# Patient Record
Sex: FEMALE | Race: WHITE | NOT HISPANIC OR LATINO | Employment: FULL TIME | URBAN - METROPOLITAN AREA
[De-identification: names, ages, dates, MRNs, and addresses within clinical notes are randomized per-mention and may not be internally consistent; named-entity substitution may affect disease eponyms.]

---

## 2022-04-07 ENCOUNTER — HISTORICAL (OUTPATIENT)
Dept: ADMINISTRATIVE | Facility: HOSPITAL | Age: 47
End: 2022-04-07

## 2022-04-23 VITALS
DIASTOLIC BLOOD PRESSURE: 83 MMHG | WEIGHT: 130.06 LBS | BODY MASS INDEX: 22.2 KG/M2 | SYSTOLIC BLOOD PRESSURE: 124 MMHG | OXYGEN SATURATION: 95 % | HEIGHT: 64 IN

## 2023-12-10 ENCOUNTER — OFFICE VISIT (OUTPATIENT)
Dept: URGENT CARE | Facility: CLINIC | Age: 48
End: 2023-12-10
Payer: MEDICAID

## 2023-12-10 VITALS
OXYGEN SATURATION: 99 % | SYSTOLIC BLOOD PRESSURE: 116 MMHG | RESPIRATION RATE: 18 BRPM | DIASTOLIC BLOOD PRESSURE: 76 MMHG | HEART RATE: 66 BPM | BODY MASS INDEX: 21.34 KG/M2 | WEIGHT: 125 LBS | HEIGHT: 64 IN | TEMPERATURE: 98 F

## 2023-12-10 DIAGNOSIS — J02.9 PHARYNGITIS, UNSPECIFIED ETIOLOGY: Primary | ICD-10-CM

## 2023-12-10 LAB
CTP QC/QA: YES
MOLECULAR STREP A: NEGATIVE

## 2023-12-10 PROCEDURE — 99213 PR OFFICE/OUTPT VISIT, EST, LEVL III, 20-29 MIN: ICD-10-PCS | Mod: ,,,

## 2023-12-10 PROCEDURE — 87651 STREP A DNA AMP PROBE: CPT | Mod: QW,,,

## 2023-12-10 PROCEDURE — 99213 OFFICE O/P EST LOW 20 MIN: CPT | Mod: ,,,

## 2023-12-10 PROCEDURE — 87651 POCT STREP A MOLECULAR: ICD-10-PCS | Mod: QW,,,

## 2023-12-10 RX ORDER — PREDNISONE 20 MG/1
TABLET ORAL
Qty: 8 TABLET | Refills: 0 | Status: SHIPPED | OUTPATIENT
Start: 2023-12-10

## 2023-12-10 NOTE — PATIENT INSTRUCTIONS
Strep swab negative  Condition and course discussed.   Steroids may assist with sore throat.  Drink plenty of fluids and get plenty of rest.    Claritin, Zyrtec or other OTC antihistamine for nasal congestion.   Warm saltwater gargles for sore throat.  Warm water with honey to help coat the throat.  Throat lozenges.  Chloraseptic Spray for worsening sore throat  Tylenol and ibuprofen as needed for sore throat and fever.       Call or return to clinic as needed.  Go to the ER with any significant change or worsening of symptoms.   Follow up with your primary care doctor.  If you do not have a primary care provider and need a referral, one may be placed for you by the clinic.     Smoking cessation strongly encouraged.  Assistance offered, information will be provided.  If not ready to quit now, patient will contact this clinic in the future if I can be of any specific health related to the discontinuation of smoking/tobacco use.

## 2023-12-10 NOTE — PROGRESS NOTES
"Subjective:      Patient ID: Tonya Mariano is a 48 y.o. female.    Vitals:  height is 5' 4" (1.626 m) and weight is 56.7 kg (125 lb). Her oral temperature is 98.1 °F (36.7 °C). Her blood pressure is 116/76 and her pulse is 66. Her respiration is 18 and oxygen saturation is 99%.     Chief Complaint: Sore Throat     Patient is a 48 y.o. female who presents to urgent care with complaints of right sided sore throat that began 3 days ago.  Reports sore throat is unilateral.  Pain with swallowing.  Alleviating factors include warm liquids with mild amount of relief. Patient denies fever, cough, congestion, body aches, neck stiffness, rash, GI symptoms, shortness breath or difficulty swallowing.      ROS   Objective:     Physical Exam   Constitutional: She is oriented to person, place, and time. She appears well-developed. She is cooperative.  Non-toxic appearance. She does not appear ill. No distress.   HENT:   Head: Normocephalic and atraumatic.   Ears:   Right Ear: Hearing, tympanic membrane, external ear and ear canal normal.   Left Ear: Hearing, tympanic membrane, external ear and ear canal normal.   Nose: Nose normal. No mucosal edema, rhinorrhea or nasal deformity. No epistaxis. Right sinus exhibits no maxillary sinus tenderness and no frontal sinus tenderness. Left sinus exhibits no maxillary sinus tenderness and no frontal sinus tenderness.   Mouth/Throat: Uvula is midline, oropharynx is clear and moist and mucous membranes are normal. Mucous membranes are moist. No trismus in the jaw. Normal dentition. No uvula swelling. No oropharyngeal exudate, posterior oropharyngeal edema or posterior oropharyngeal erythema.   Eyes: Conjunctivae and lids are normal. No scleral icterus.   Neck: Trachea normal and phonation normal. Neck supple. No edema present. No erythema present. No neck rigidity present.   Cardiovascular: Normal rate, regular rhythm, normal heart sounds and normal pulses.   Pulmonary/Chest: Effort normal " and breath sounds normal. No respiratory distress. She has no decreased breath sounds. She has no wheezes. She has no rhonchi. She has no rales.   Abdominal: Normal appearance.   Musculoskeletal: Normal range of motion.         General: No deformity. Normal range of motion.   Lymphadenopathy:     She has no cervical adenopathy.   Neurological: She is alert and oriented to person, place, and time. She exhibits normal muscle tone. Coordination normal.   Skin: Skin is warm, dry, intact, not diaphoretic and not pale.   Psychiatric: Her speech is normal and behavior is normal. Judgment and thought content normal.   Nursing note and vitals reviewed.      Assessment:     1. Pharyngitis, unspecified etiology        Plan:       Pharyngitis, unspecified etiology  -     POCT Strep A, Molecular    Other orders  -     predniSONE (DELTASONE) 20 MG tablet; One tablet orally twice daily for 3 days and then once daily for 2 days  Dispense: 8 tablet; Refill: 0    Due to absence of other symptoms and unilateral nature of sore throat, discussed calling back into clinic for possible ENT referral or follow up with primary care provider if symptoms fail to improve despite over-the-counter and steroid treatment.    Patient declines testing in clinic.    Strep swab negative  Condition and course discussed.   Steroids may assist with sore throat.  Drink plenty of fluids and get plenty of rest.    Claritin, Zyrtec or other OTC antihistamine for nasal congestion.   Warm saltwater gargles for sore throat.  Warm water with honey to help coat the throat.  Throat lozenges.  Chloraseptic Spray for worsening sore throat  Tylenol and ibuprofen as needed for sore throat and fever.       Call or return to clinic as needed.  Go to the ER with any significant change or worsening of symptoms.   Follow up with your primary care doctor.  If you do not have a primary care provider and need a referral, one may be placed for you by the clinic.     Smoking  cessation strongly encouraged.  Assistance offered, information will be provided.  If not ready to quit now, patient will contact this clinic in the future if I can be of any specific health related to the discontinuation of smoking/tobacco use.

## 2025-06-19 ENCOUNTER — TELEPHONE (OUTPATIENT)
Dept: PRIMARY CARE CLINIC | Facility: CLINIC | Age: 50
End: 2025-06-19
Payer: COMMERCIAL

## 2025-06-26 ENCOUNTER — OFFICE VISIT (OUTPATIENT)
Dept: PRIMARY CARE CLINIC | Facility: CLINIC | Age: 50
End: 2025-06-26
Payer: COMMERCIAL

## 2025-06-26 VITALS
WEIGHT: 143 LBS | DIASTOLIC BLOOD PRESSURE: 84 MMHG | BODY MASS INDEX: 24.41 KG/M2 | SYSTOLIC BLOOD PRESSURE: 134 MMHG | TEMPERATURE: 98 F | RESPIRATION RATE: 18 BRPM | HEART RATE: 66 BPM | HEIGHT: 64 IN | OXYGEN SATURATION: 98 %

## 2025-06-26 DIAGNOSIS — E86.0 DEHYDRATION: ICD-10-CM

## 2025-06-26 DIAGNOSIS — Z76.89 ENCOUNTER TO ESTABLISH CARE WITH NEW DOCTOR: Primary | ICD-10-CM

## 2025-06-26 DIAGNOSIS — F17.210 CIGARETTE NICOTINE DEPENDENCE WITHOUT COMPLICATION: Chronic | ICD-10-CM

## 2025-06-26 DIAGNOSIS — M54.50 CHRONIC BILATERAL LOW BACK PAIN WITHOUT SCIATICA: Chronic | ICD-10-CM

## 2025-06-26 DIAGNOSIS — E83.39 HYPERPHOSPHATEMIA: ICD-10-CM

## 2025-06-26 DIAGNOSIS — E87.5 HYPERKALEMIA: ICD-10-CM

## 2025-06-26 DIAGNOSIS — G89.29 CHRONIC BILATERAL LOW BACK PAIN WITHOUT SCIATICA: Chronic | ICD-10-CM

## 2025-06-26 DIAGNOSIS — H61.23 EXCESSIVE CERUMEN IN EAR CANAL, BILATERAL: ICD-10-CM

## 2025-06-26 DIAGNOSIS — E83.52 HYPERCALCEMIA: ICD-10-CM

## 2025-06-26 DIAGNOSIS — Z00.00 WELLNESS EXAMINATION: ICD-10-CM

## 2025-06-26 DIAGNOSIS — F41.1 GAD (GENERALIZED ANXIETY DISORDER): ICD-10-CM

## 2025-06-26 DIAGNOSIS — E78.2 MIXED HYPERLIPIDEMIA: Chronic | ICD-10-CM

## 2025-06-26 PROCEDURE — 99204 OFFICE O/P NEW MOD 45 MIN: CPT | Mod: 25,,, | Performed by: STUDENT IN AN ORGANIZED HEALTH CARE EDUCATION/TRAINING PROGRAM

## 2025-06-26 PROCEDURE — 69210 REMOVE IMPACTED EAR WAX UNI: CPT | Mod: ,,, | Performed by: STUDENT IN AN ORGANIZED HEALTH CARE EDUCATION/TRAINING PROGRAM

## 2025-06-26 PROCEDURE — 3075F SYST BP GE 130 - 139MM HG: CPT | Mod: CPTII,,, | Performed by: STUDENT IN AN ORGANIZED HEALTH CARE EDUCATION/TRAINING PROGRAM

## 2025-06-26 PROCEDURE — 1160F RVW MEDS BY RX/DR IN RCRD: CPT | Mod: CPTII,,, | Performed by: STUDENT IN AN ORGANIZED HEALTH CARE EDUCATION/TRAINING PROGRAM

## 2025-06-26 PROCEDURE — 3079F DIAST BP 80-89 MM HG: CPT | Mod: CPTII,,, | Performed by: STUDENT IN AN ORGANIZED HEALTH CARE EDUCATION/TRAINING PROGRAM

## 2025-06-26 PROCEDURE — 1159F MED LIST DOCD IN RCRD: CPT | Mod: CPTII,,, | Performed by: STUDENT IN AN ORGANIZED HEALTH CARE EDUCATION/TRAINING PROGRAM

## 2025-06-26 PROCEDURE — 3008F BODY MASS INDEX DOCD: CPT | Mod: CPTII,,, | Performed by: STUDENT IN AN ORGANIZED HEALTH CARE EDUCATION/TRAINING PROGRAM

## 2025-06-26 RX ORDER — FLUTICASONE PROPIONATE 50 MCG
1 SPRAY, SUSPENSION (ML) NASAL DAILY PRN
COMMUNITY

## 2025-06-26 RX ORDER — IBUPROFEN 800 MG/1
800 TABLET, FILM COATED ORAL EVERY 6 HOURS PRN
COMMUNITY

## 2025-06-26 RX ORDER — DIAZEPAM 5 MG/1
5 TABLET ORAL EVERY 12 HOURS PRN
Qty: 30 TABLET | Refills: 3 | Status: SHIPPED | OUTPATIENT
Start: 2025-06-26

## 2025-06-27 ENCOUNTER — TELEPHONE (OUTPATIENT)
Dept: PRIMARY CARE CLINIC | Facility: CLINIC | Age: 50
End: 2025-06-27
Payer: COMMERCIAL

## 2025-06-27 DIAGNOSIS — Z72.0 TOBACCO USE: Primary | ICD-10-CM

## 2025-06-27 DIAGNOSIS — Z72.0 TOBACCO USE: ICD-10-CM

## 2025-06-27 RX ORDER — NICOTINE 7MG/24HR
1 PATCH, TRANSDERMAL 24 HOURS TRANSDERMAL DAILY
Qty: 30 PATCH | Refills: 0 | Status: SHIPPED | OUTPATIENT
Start: 2025-06-27

## 2025-06-27 RX ORDER — NICOTINE 7MG/24HR
1 PATCH, TRANSDERMAL 24 HOURS TRANSDERMAL DAILY
Qty: 30 PATCH | Refills: 0 | Status: SHIPPED | OUTPATIENT
Start: 2025-06-27 | End: 2025-06-27 | Stop reason: SDUPTHER

## 2025-06-27 NOTE — TELEPHONE ENCOUNTER
Spoke with patient and she stated she qualified for the smoking cessation program and possibly get the nicotine patches for free. Patient stated she will reach out to the program and If she can't get the patches for free, she will reach out to us to fill the nicotine patches.

## 2025-06-27 NOTE — TELEPHONE ENCOUNTER
Copied from CRM #[id number]. Topic: General Inquiry - Return Call  >> Jun 27, 2025  8:29 AM Loren wrote:  .Type:  Patient Returning Call    Who Called:pt  Who Left Message for Patient:pt  Does the patient know what this is regarding?: nicotine patches    Would the patient rather a call back or a response via [employer]? ADRIANA  Best Call Back Number:[phone]  Additional Information: Please call back patient did reach out and was told to contact her Dr for the patches

## 2025-06-27 NOTE — TELEPHONE ENCOUNTER
No answer, left voicemail  Patient was advised her nicotine patches will be sent to the pharmacy.

## 2025-06-27 NOTE — TELEPHONE ENCOUNTER
Copied from CRM #[id number]. Topic: Medications - Medication Refill  >> Jun 27, 2025  9:05 AM Ramya wrote:  Who Called: Tonya Mariano    Refill or New Rx:Refill  RX Name and Strength: nicotine (NICODERM CQ) 7 mg/24 hr  How is the patient currently taking it? (ex. 1XDay):   Is this a 30 day or 90 day RX:   Local or Mail Order: [id number] - [place], [place] - [address] AT [facility] [address]  List of preferred pharmacies on file (remove unneeded): [unfilled]  If different Pharmacy is requested, enter Pharmacy information here including location and phone number:     Ordering Provider:       Preferred Method of Contact: Phone Call  Patient's Preferred Phone Number on File: [phone]   Best Call Back Number, if different:  Additional Information:  med was sent to wrong pharmacy

## 2025-06-27 NOTE — TELEPHONE ENCOUNTER
Copied from CRM #[id number]. Topic: General Inquiry - Patient Advice  >> Jun 26, 2025  2:20 PM Dasia wrote:  Pt requesting a call back about a prescription not in med list

## 2025-06-28 ENCOUNTER — PATIENT MESSAGE (OUTPATIENT)
Dept: ADMINISTRATIVE | Facility: HOSPITAL | Age: 50
End: 2025-06-28
Payer: COMMERCIAL

## 2025-06-29 DIAGNOSIS — Z12.11 SCREENING FOR COLON CANCER: ICD-10-CM

## 2025-06-29 PROBLEM — M54.50 CHRONIC BILATERAL LOW BACK PAIN WITHOUT SCIATICA: Chronic | Status: ACTIVE | Noted: 2025-06-29

## 2025-06-29 PROBLEM — F17.210 CIGARETTE NICOTINE DEPENDENCE WITHOUT COMPLICATION: Status: ACTIVE | Noted: 2025-06-29

## 2025-06-29 PROBLEM — Z00.00 WELLNESS EXAMINATION: Status: ACTIVE | Noted: 2025-06-29

## 2025-06-29 PROBLEM — E78.2 MIXED HYPERLIPIDEMIA: Status: ACTIVE | Noted: 2025-06-29

## 2025-06-29 PROBLEM — G89.29 CHRONIC BILATERAL LOW BACK PAIN WITHOUT SCIATICA: Chronic | Status: ACTIVE | Noted: 2025-06-29

## 2025-06-29 PROBLEM — H61.23 EXCESSIVE CERUMEN IN EAR CANAL, BILATERAL: Status: ACTIVE | Noted: 2025-06-29

## 2025-06-29 NOTE — PROCEDURES
Ear Cerumen Removal    Date/Time: 6/26/2025 10:40 AM    Performed by: Sandie Duran MD  Authorized by: Sandie Duran MD    Consent Done?:  Yes (Verbal)  Medication Used:  Other (Hydrogen Peroxide and Water)  Location details:  Both ears  Procedure type: curette and irrigation    Cerumen  Removal Results:  Cerumen completely removed  Patient tolerance:  Patient tolerated the procedure well with no immediate complications

## 2025-06-29 NOTE — PROGRESS NOTES
"Subjective:       Patient ID: Tonya Mariano is a 50 y.o. female.    No past medical history on file.     Chief Complaint: Establish Care    History of Present Illness    CHIEF COMPLAINT:  Patient presents today to establish care and discuss employer health check lab results    LABS:  Recent lab results from May 15th show elevated potassium, calcium, phosphorus, and albumin levels. BUN and creatinine ratio suggest mild dehydration. Cholesterol profile reveals total cholesterol of 198 with elevated LDL and low HDL at 40 (below recommended 50 for females). Thyroid levels, blood counts, liver function, and iron levels are within normal range. Fasting glucose is less than 100. She denies taking calcium or potassium supplements or multivitamins.    ANXIETY:  She reports worsening anxiety since 2021 due to increased life stressors, describing it as "through the roof most days". She experiences sleep disturbances with consistent bed and wake times but frequent nighttime awakenings due to racing thoughts. She had previous negative responses to psychiatric medications: Lexapro (caused depression), Prozac (induced palpitations), and possible Xanax (triggered anger). She reports positive response to Valium 5mg. She previously benefited from psychotherapy 17 years ago and is interested in resuming therapy. She desires to address anxiety without becoming medication dependent.    ENT:  She reports significant left ear hearing difficulty with persistent ear fullness. She has a history of ear surgeries at age 1 with ear drainage. Currently, her left ear feels consistently "stuffed up" with significant hearing impairment.    CHRONIC PAIN:  She has chronic back pain related to back surgery in 2014 and diagnosed arthritis. Currently manages pain with ibuprofen 800mg every 6 hours (3200mg daily). Previously used BC powder for pain management for several years before transitioning to current ibuprofen regimen per previous doctor " recommendation.    SMOKING HISTORY:  She currently uses vape since March 2023, transitioning from nicotine patches (21mg) used January to March 2023. Prior smoking history includes approximately 10 cigarettes daily. She reports feeling less healthy with vaping compared to traditional cigarettes. She previously gained 16 lbs in 6 weeks during a smoking cessation attempt.        Review of Systems   Constitutional:  Negative for chills and fever.   HENT:  Positive for hearing loss.    Respiratory:  Negative for cough.    Cardiovascular:  Negative for chest pain.   Genitourinary:  Negative for dysuria and hematuria.   Psychiatric/Behavioral:  The patient is nervous/anxious.            Objective:      Physical Exam  Vitals and nursing note reviewed.   Constitutional:       General: She is not in acute distress.     Appearance: Normal appearance. She is not ill-appearing.   HENT:      Head: Normocephalic.      Right Ear: External ear normal. There is impacted cerumen.      Left Ear: External ear normal. There is impacted cerumen.      Nose: Nose normal. No rhinorrhea.      Mouth/Throat:      Mouth: Mucous membranes are moist.   Eyes:      General: No scleral icterus.     Conjunctiva/sclera: Conjunctivae normal.   Cardiovascular:      Rate and Rhythm: Normal rate and regular rhythm.   Pulmonary:      Effort: Pulmonary effort is normal. No respiratory distress.   Musculoskeletal:         General: No deformity.   Skin:     General: Skin is warm and dry.      Coloration: Skin is not jaundiced.   Neurological:      Mental Status: She is alert and oriented to person, place, and time. Mental status is at baseline.      Gait: Gait normal.   Psychiatric:         Mood and Affect: Mood normal.         Behavior: Behavior normal.         Assessment & Plan:   Assessment & Plan    Z76.89 Encounter to establish care with new doctor   F41.1 SANTIAGO   E83.52 Hypercalcemia   E87.5 Hyperkalemia   E83.39 Hyperphosphatemia   H61.23 Excessive  cerumen in ear canal, bilateral   F17.219 Cigarette nicotine dependence   E78.2 Mixed Hyperlipidemia  M54.50, G89.29 Chronic bilateral low back pain without sciatica  E86.0 Dehydration    IMPRESSION:  Assessed lab results from May, noting elevated potassium, calcium, and phosphorus likely due to dehydration based on BUN/creatinine ratio and albumin levels.  Evaluated lipid panel, noting slightly elevated LDL and low HDL.  Considered ASCVD risk factors, including smoking status and family history.  Primary health concerns are smoking cessation, anxiety management, and hearing issues.  Repeat labs to confirm electrolyte levels and monitor renal function due to chronic NSAID use.    ANXIETY DISORDER:  - Evaluated patient's anxiety, which has worsened since 2021, affecting sleep and daily functioning.  - Assessed therapy and medication options for management.  - Explained the concept of using anxiety medication on an as-needed basis, likening it to using an umbrella on rainy days.  - Prescribed Valium 5 mg as needed for anxiety, with option to take half a tablet initially.  - Provided list of recommended therapists and advised the patient to start therapy in conjunction with medication for comprehensive anxiety treatment.    SENSORINEURAL HEARING LOSS (LEFT EAR):  - Evaluated the patient's complaint of feeling deaf in the left ear and confirmed wax buildup affecting hearing.  - Planned to remove cerumen impaction in the left ear to improve auditory function.    BACK PAIN:  - Monitored the patient's arthritis in the back, which is currently managed with ibuprofen.  - Continued the current regimen of 800 mg ibuprofen every 6 hours for arthritis management.    NICOTINE DEPENDENCE:  - Monitored the patient's smoking cessation efforts, noting switch from cigarettes to vaping after running out of nicotine patches in January.  - Evaluated patient's report of feeling less healthy with vaping compared to cigarettes.  - Educated  on the benefits of quitting smoking and its impact on overall health.  - Prescribed nicotine replacement therapy (patches) for smoking cessation, starting with maintenance phase.  - Encouraged lifestyle modifications to support complete smoking cessation.    ELECTROLYTE IMBALANCES (HYPERKALEMIA, HYPERCALCEMIA, PHOSPHORUS METABOLISM DISORDER):  - Monitored elevated potassium, calcium, and phosphorus levels, likely due to dehydration rather than dietary factors.  - Explained lab results related to electrolytes.  - Ordered repeat labs to reassess all electrolyte levels and hydration status.  - Recommend increased fluid intake before repeating labs.    HYPERLIPIDEMIA:  - Monitored lipid panel showing elevated LDL cholesterol, low HDL cholesterol, and elevated triglycerides.  - Evaluated cholesterol levels as risk factors for cardiovascular disease and cerebrovascular accidents.  - Discussed ASCVD risk factors and the importance of maintaining healthy cholesterol levels.  - Recommend incorporating healthy fats in diet, such as omega-3 fatty acids.  - Advised smoking cessation and focus on diet and exercise to manage cholesterol levels.  - Ordered repeat lipid panel in November.    DEHYDRATION:  - Monitored BUN and creat  inine ratio above 20 and elevated albumin, indicating dehydration.  - Evaluated kidney function, which appears good despite elevated BUN and creatinine ratio, suggesting dehydration rather than renal dysfunction.  - Provided information on the relationship between dehydration and elevated electrolyte levels.  - Advised the patient to increase water intake to improve hydration status.  - Planned to repeat labs to reassess electrolyte levels and monitor kidney function.    LONG-TERM NSAID USE:  - Monitored the patient's chronic use of ibuprofen at 800 mg every 6 hours, totaling 3200 mg daily.  - Evaluated potential renal effects of chronic NSAID use.  - Planned to monitor kidney function due to long-term  NSAID therapy.  - Advised the patient to continue ibuprofen for arthritis and sinus problems as prescribed.    FOLLOW-UP AND GENERAL RECOMMENDATIONS:  - Recommend increasing exercise.  - Follow up in mid-November for wellness check and to review repeat lab results, including renal function tests and other ordered labs.  - Contact office with any concerns before next appointment.         Follow up in about 5 months (around 11/11/2025) for Wellness, Lab Results. In addition to their scheduled follow up, the patient has also been instructed to follow up on as needed basis.    This note was generated with the assistance of ambient listening technology. Verbal consent was obtained by the patient and accompanying visitor(s) for the recording of patient appointment to facilitate this note. I attest to having reviewed and edited the generated note for accuracy, though some syntax or spelling errors may persist. Please contact the author of this note for any clarification.